# Patient Record
Sex: MALE | Race: WHITE | NOT HISPANIC OR LATINO | Employment: FULL TIME | URBAN - METROPOLITAN AREA
[De-identification: names, ages, dates, MRNs, and addresses within clinical notes are randomized per-mention and may not be internally consistent; named-entity substitution may affect disease eponyms.]

---

## 2024-03-09 ENCOUNTER — OFFICE VISIT (OUTPATIENT)
Dept: URGENT CARE | Facility: URGENT CARE | Age: 54
End: 2024-03-09
Payer: COMMERCIAL

## 2024-03-09 VITALS
DIASTOLIC BLOOD PRESSURE: 139 MMHG | SYSTOLIC BLOOD PRESSURE: 193 MMHG | OXYGEN SATURATION: 99 % | HEART RATE: 91 BPM | WEIGHT: 200 LBS | TEMPERATURE: 98.4 F

## 2024-03-09 DIAGNOSIS — K04.7 DENTAL ABSCESS: Primary | ICD-10-CM

## 2024-03-09 PROCEDURE — 99203 OFFICE O/P NEW LOW 30 MIN: CPT | Performed by: INTERNAL MEDICINE

## 2024-03-09 RX ORDER — AMOXICILLIN 875 MG
875 TABLET ORAL 2 TIMES DAILY
Qty: 14 TABLET | Refills: 0 | Status: SHIPPED | OUTPATIENT
Start: 2024-03-09 | End: 2024-03-16

## 2024-03-09 NOTE — PROGRESS NOTES
Assessment & Plan     Dental abscess  - amoxicillin (AMOXIL) 875 MG tablet; Take 1 tablet (875 mg) by mouth 2 times daily for 7 days    Subjective   Luisito is a 53 year old, presenting for the following health issues:  Urgent Care and Dental Pain (C/O dental pain for 4 days)    HPI   Complaining of some dental pain in the right lower jaw that has been present for several days.  Now having more pain and swelling in the right lower neck and jaw as well as odynophagia. Denies fevers or chills.     Review of Systems  Constitutional, HEENT, cardiovascular, pulmonary, gi and gu systems are negative, except as otherwise noted.      Objective    BP (!) 193/139   Pulse 91   Temp 98.4  F (36.9  C) (Tympanic)   Wt 90.7 kg (200 lb)   SpO2 99%   There is no height or weight on file to calculate BMI.  Physical Exam   GENERAL: alert and no distress  HENT: tender to palpation at the right lower molar with gingival swelling; no other oropharyngeal swelling or erythema; modest right facial swelling  NECK: FROM without pain; no tenderness, adenopathy or mass          Signed Electronically by: Manohar Boone MD

## 2024-04-21 ENCOUNTER — OFFICE VISIT (OUTPATIENT)
Dept: URGENT CARE | Facility: URGENT CARE | Age: 54
End: 2024-04-21
Payer: COMMERCIAL

## 2024-04-21 VITALS
SYSTOLIC BLOOD PRESSURE: 190 MMHG | HEART RATE: 100 BPM | WEIGHT: 200 LBS | DIASTOLIC BLOOD PRESSURE: 110 MMHG | HEIGHT: 72 IN | TEMPERATURE: 97.8 F | OXYGEN SATURATION: 97 % | BODY MASS INDEX: 27.09 KG/M2

## 2024-04-21 DIAGNOSIS — I10 HYPERTENSION, UNSPECIFIED TYPE: ICD-10-CM

## 2024-04-21 DIAGNOSIS — K04.7 DENTAL INFECTION: Primary | ICD-10-CM

## 2024-04-21 PROCEDURE — 99214 OFFICE O/P EST MOD 30 MIN: CPT | Performed by: NURSE PRACTITIONER

## 2024-04-21 RX ORDER — IBUPROFEN 200 MG
200 TABLET ORAL EVERY 4 HOURS PRN
COMMUNITY

## 2024-04-21 RX ORDER — CLINDAMYCIN HCL 300 MG
300 CAPSULE ORAL 3 TIMES DAILY
Qty: 30 CAPSULE | Refills: 0 | Status: SHIPPED | OUTPATIENT
Start: 2024-04-21 | End: 2024-05-01

## 2024-04-21 NOTE — PROGRESS NOTES
Chief Complaint   Patient presents with    Urgent Care    Dental Pain     Pt in clinic to have eval for dental pain.     SUBJECTIVE:  Luisito Calderon is a 53 year old male presenting with right lower dental pain and swelling over the past couple days.  Completed amoxicillin about a month ago.  He has a dentist appointment in the morning.  Felt a swollen area in his mouth almost pop and provided relief of the pain momentarily.  Declines purulent discharge foul taste fever sweats chills nausea vomiting trismus.  Of note patient does suffer from whitecoat hypertension.  He does have a blood pressure cuff at home to start monitoring.  Declines headache blurred vision chest pain dizziness edema.  Has never been on blood pressure medicine before.  He does believe it is also elevated due to the pain.  Taking Tylenol ibuprofen.    No past medical history on file.  Current Outpatient Medications   Medication Sig Dispense Refill    clindamycin (CLEOCIN) 300 MG capsule Take 1 capsule (300 mg) by mouth 3 times daily for 10 days 30 capsule 0    ibuprofen (ADVIL/MOTRIN) 200 MG tablet Take 200 mg by mouth every 4 hours as needed for pain       No current facility-administered medications for this visit.     Social History     Tobacco Use    Smoking status: Never     Passive exposure: Never    Smokeless tobacco: Never   Substance Use Topics    Alcohol use: Not on file     No Known Allergies    Review of Systems  Systems negative except for those listed above in HPI.    OBJECTIVE:   BP (!) 190/110   Pulse 100   Temp 97.8  F (36.6  C) (Temporal)   Ht 1.829 m (6')   Wt 90.7 kg (200 lb)   SpO2 97%   BMI 27.12 kg/m    Physical Exam  Vitals reviewed.   Constitutional:       General: He is not in acute distress.     Appearance: Normal appearance. He is not ill-appearing, toxic-appearing or diaphoretic.   HENT:      Head: Normocephalic and atraumatic.      Nose: Nose normal.      Mouth/Throat:      Mouth: Mucous membranes are moist.       Pharynx: Oropharynx is clear.      Comments: Nothing notable on exam, no erythema edema purulence lymphadenopathy.  Eyes:      Extraocular Movements: Extraocular movements intact.      Conjunctiva/sclera: Conjunctivae normal.      Pupils: Pupils are equal, round, and reactive to light.   Cardiovascular:      Rate and Rhythm: Normal rate.      Pulses: Normal pulses.   Pulmonary:      Effort: Pulmonary effort is normal.   Musculoskeletal:         General: Normal range of motion.      Cervical back: Normal range of motion and neck supple.   Skin:     General: Skin is warm and dry.      Findings: No rash.   Neurological:      General: No focal deficit present.      Mental Status: He is alert and oriented to person, place, and time.   Psychiatric:         Mood and Affect: Mood normal.         Behavior: Behavior normal.       ASSESSMENT:    ICD-10-CM    1. Dental infection  K04.7 clindamycin (CLEOCIN) 300 MG capsule      2. Hypertension, unspecified type  I10         PLAN:     It is important that you make an appointment with a dentist.  Take antibiotic as directed.  Brush teeth twice daily  Salt water gargles- mix about 8 oz of water with about 1 tsp of salt. Swish and spit.  Take Tylenol or an NSAID such as ibuprofen or naproxen as needed for pain.  May take up to 1000 mg of Tylenol every 6-8 hours- do not exceed 4000 mg in 24 hours.  May take up to 600 mg ibuprofen every 6-8 hours.  Alternate Tylenol and Ibuprofen every 3-4 hours.  Please follow up with primary care provider if not improving, worsening or new symptoms or for any adverse reactions to medications.   Some patients with mild infection may also receive oral therapy initially, pending dental or surgical evaluation. The preferred oral regimen is amoxicillin-clavulanate (875 mg orally twice daily). For penicillin-allergic patients, we usually give clindamycin 300 mg to 450 mg orally three times daily. Antibiotics should be continued until local inflammation  has resolved completely, typically for a total of 7 to 14 days (UpToDate, 2019).    Discussed untreated hypertension with patient  He has been aware of this and suspects whitecoat hypertension anxiety and pain elevating his blood pressure  Monitor home trends daily for 2 weeks  Primary care referral placed, needs reevaluation to discuss ACE ARB following BMP  140/90 sustained meets criteria to treat  180/110 sustained with symptoms is hypertensive emergency  Recommended patient go to the ER if he is to develop headache blurred vision chest pain dizziness shortness of breath swelling weakness    Follow up with primary care provider with any problems, questions or concerns or if symptoms worsen or fail to improve. Patient agreed to plan and verbalized understanding.    Shayy Yun, STEPHIE-BC  [facility] [place]

## 2024-05-07 ENCOUNTER — OFFICE VISIT (OUTPATIENT)
Dept: FAMILY MEDICINE | Facility: CLINIC | Age: 54
End: 2024-05-07
Attending: NURSE PRACTITIONER
Payer: COMMERCIAL

## 2024-05-07 VITALS
OXYGEN SATURATION: 98 % | WEIGHT: 203.5 LBS | SYSTOLIC BLOOD PRESSURE: 169 MMHG | HEART RATE: 85 BPM | TEMPERATURE: 98.8 F | BODY MASS INDEX: 27.56 KG/M2 | DIASTOLIC BLOOD PRESSURE: 119 MMHG | HEIGHT: 72 IN | RESPIRATION RATE: 11 BRPM

## 2024-05-07 DIAGNOSIS — Z23 NEED FOR VACCINATION: ICD-10-CM

## 2024-05-07 DIAGNOSIS — I10 ESSENTIAL HYPERTENSION: Primary | ICD-10-CM

## 2024-05-07 LAB
ALBUMIN UR-MCNC: NEGATIVE MG/DL
APPEARANCE UR: CLEAR
BACTERIA #/AREA URNS HPF: ABNORMAL /HPF
BILIRUB UR QL STRIP: NEGATIVE
COLOR UR AUTO: YELLOW
GLUCOSE UR STRIP-MCNC: NEGATIVE MG/DL
HGB UR QL STRIP: ABNORMAL
KETONES UR STRIP-MCNC: NEGATIVE MG/DL
LEUKOCYTE ESTERASE UR QL STRIP: NEGATIVE
NITRATE UR QL: NEGATIVE
PH UR STRIP: 6 [PH] (ref 5–7)
RBC #/AREA URNS AUTO: ABNORMAL /HPF
SP GR UR STRIP: 1.02 (ref 1–1.03)
SQUAMOUS #/AREA URNS AUTO: ABNORMAL /LPF
UROBILINOGEN UR STRIP-ACNC: 0.2 E.U./DL
WBC #/AREA URNS AUTO: ABNORMAL /HPF

## 2024-05-07 PROCEDURE — 36415 COLL VENOUS BLD VENIPUNCTURE: CPT | Performed by: FAMILY MEDICINE

## 2024-05-07 PROCEDURE — 90471 IMMUNIZATION ADMIN: CPT | Performed by: FAMILY MEDICINE

## 2024-05-07 PROCEDURE — 90750 HZV VACC RECOMBINANT IM: CPT | Performed by: FAMILY MEDICINE

## 2024-05-07 PROCEDURE — 99214 OFFICE O/P EST MOD 30 MIN: CPT | Mod: 25 | Performed by: FAMILY MEDICINE

## 2024-05-07 PROCEDURE — 90715 TDAP VACCINE 7 YRS/> IM: CPT | Performed by: FAMILY MEDICINE

## 2024-05-07 PROCEDURE — 80053 COMPREHEN METABOLIC PANEL: CPT | Performed by: FAMILY MEDICINE

## 2024-05-07 PROCEDURE — 81001 URINALYSIS AUTO W/SCOPE: CPT | Performed by: FAMILY MEDICINE

## 2024-05-07 PROCEDURE — 80061 LIPID PANEL: CPT | Performed by: FAMILY MEDICINE

## 2024-05-07 PROCEDURE — 90472 IMMUNIZATION ADMIN EACH ADD: CPT | Performed by: FAMILY MEDICINE

## 2024-05-07 RX ORDER — AMLODIPINE BESYLATE 10 MG/1
5-10 TABLET ORAL DAILY
Qty: 90 TABLET | Refills: 3 | Status: SHIPPED | OUTPATIENT
Start: 2024-05-07 | End: 2024-06-12

## 2024-05-07 ASSESSMENT — PAIN SCALES - GENERAL: PAINLEVEL: NO PAIN (0)

## 2024-05-07 NOTE — PROGRESS NOTES
"  Assessment & Plan     Essential hypertension  - REVIEW OF HEALTH MAINTENANCE PROTOCOL ORDERS  - Lipid panel reflex to direct LDL Non-fasting; Future  - amLODIPine (NORVASC) 10 MG tablet; Take 0.5-1 tablets (5-10 mg) by mouth daily  - Comprehensive metabolic panel (BMP + Alb, Alk Phos, ALT, AST, Total. Bili, TP); Future  - UA Macroscopic with reflex to Microscopic and Culture - Lab Collect; Future    Need for vaccination  - TDAP 10-64Y (ADACEL,BOOSTRIX)  - ZOSTER RECOMBINANT ADJUVANTED (SHINGRIX)      MED REC REQUIRED  Post Medication Reconciliation Status: discharge medications reconciled and changed, per note/orders  BMI  Estimated body mass index is 27.87 kg/m  as calculated from the following:    Height as of this encounter: 1.82 m (5' 11.65\").    Weight as of this encounter: 92.3 kg (203 lb 8 oz).   Weight management plan: Discussed healthy diet and exercise guidelines      Brittney Jorge is a 53 year old, presenting for the following health issues:  Hypertension      5/7/2024     2:54 PM   Additional Questions   Roomed by Minna JACOBS     HTN  Tonya   Takes it at home over the last couple weeks   Usually 150s /100s at home   169/119 here   No chest pain   No LOC    No trouble breathing   No urine problems  Once a night to pee   A little  harder to empty   No significant joint pain     Thinks he may have had gout a year ago     Once a year will have a second of  palpitation           Objective    BP (!) 169/119 (BP Location: Right arm, Patient Position: Sitting, Cuff Size: Adult Regular)   Pulse 85   Temp 98.8  F (37.1  C) (Temporal)   Resp 11   Ht 1.82 m (5' 11.65\")   Wt 92.3 kg (203 lb 8 oz)   SpO2 98%   BMI 27.87 kg/m    Body mass index is 27.87 kg/m .  Physical Exam  Constitutional:       General: He is not in acute distress.  Eyes:      General: No scleral icterus.  Cardiovascular:      Rate and Rhythm: Normal rate and regular rhythm.   Pulmonary:      Effort: No respiratory distress.      " Breath sounds: Normal breath sounds.   Neurological:      Mental Status: He is alert.   Psychiatric:         Mood and Affect: Mood normal.         Behavior: Behavior normal.               Signed Electronically by: Richi Singh DO

## 2024-05-08 LAB
ALBUMIN SERPL BCG-MCNC: 4.8 G/DL (ref 3.5–5.2)
ALP SERPL-CCNC: 38 U/L (ref 40–150)
ALT SERPL W P-5'-P-CCNC: 32 U/L (ref 0–70)
ANION GAP SERPL CALCULATED.3IONS-SCNC: 11 MMOL/L (ref 7–15)
AST SERPL W P-5'-P-CCNC: 26 U/L (ref 0–45)
BILIRUB SERPL-MCNC: 0.5 MG/DL
BUN SERPL-MCNC: 15.5 MG/DL (ref 6–20)
CALCIUM SERPL-MCNC: 10 MG/DL (ref 8.6–10)
CHLORIDE SERPL-SCNC: 99 MMOL/L (ref 98–107)
CHOLEST SERPL-MCNC: 193 MG/DL
CREAT SERPL-MCNC: 1.06 MG/DL (ref 0.67–1.17)
DEPRECATED HCO3 PLAS-SCNC: 27 MMOL/L (ref 22–29)
EGFRCR SERPLBLD CKD-EPI 2021: 83 ML/MIN/1.73M2
FASTING STATUS PATIENT QL REPORTED: NO
FASTING STATUS PATIENT QL REPORTED: NO
GLUCOSE SERPL-MCNC: 91 MG/DL (ref 70–99)
HDLC SERPL-MCNC: 50 MG/DL
LDLC SERPL CALC-MCNC: 121 MG/DL
NONHDLC SERPL-MCNC: 143 MG/DL
POTASSIUM SERPL-SCNC: 4.5 MMOL/L (ref 3.4–5.3)
PROT SERPL-MCNC: 7.9 G/DL (ref 6.4–8.3)
SODIUM SERPL-SCNC: 137 MMOL/L (ref 135–145)
TRIGL SERPL-MCNC: 112 MG/DL

## 2024-05-19 ENCOUNTER — HEALTH MAINTENANCE LETTER (OUTPATIENT)
Age: 54
End: 2024-05-19

## 2024-06-12 ENCOUNTER — MYC MEDICAL ADVICE (OUTPATIENT)
Dept: FAMILY MEDICINE | Facility: CLINIC | Age: 54
End: 2024-06-12
Payer: COMMERCIAL

## 2024-06-12 DIAGNOSIS — I10 ESSENTIAL HYPERTENSION: Primary | ICD-10-CM

## 2024-06-12 RX ORDER — LOSARTAN POTASSIUM 50 MG/1
25-50 TABLET ORAL DAILY
Qty: 90 TABLET | Refills: 3 | Status: SHIPPED | OUTPATIENT
Start: 2024-06-12

## 2024-12-16 ENCOUNTER — MYC REFILL (OUTPATIENT)
Dept: FAMILY MEDICINE | Facility: CLINIC | Age: 54
End: 2024-12-16
Payer: COMMERCIAL

## 2024-12-16 DIAGNOSIS — I10 ESSENTIAL HYPERTENSION: ICD-10-CM

## 2024-12-17 RX ORDER — LOSARTAN POTASSIUM 50 MG/1
25-50 TABLET ORAL DAILY
Qty: 90 TABLET | Refills: 3 | Status: SHIPPED | OUTPATIENT
Start: 2024-12-17

## 2025-02-12 ENCOUNTER — OFFICE VISIT (OUTPATIENT)
Dept: FAMILY MEDICINE | Facility: CLINIC | Age: 55
End: 2025-02-12
Payer: COMMERCIAL

## 2025-02-12 VITALS
WEIGHT: 213 LBS | RESPIRATION RATE: 18 BRPM | TEMPERATURE: 98 F | HEART RATE: 95 BPM | OXYGEN SATURATION: 100 % | BODY MASS INDEX: 28.85 KG/M2 | SYSTOLIC BLOOD PRESSURE: 156 MMHG | HEIGHT: 72 IN | DIASTOLIC BLOOD PRESSURE: 102 MMHG

## 2025-02-12 DIAGNOSIS — N50.811 PAIN IN RIGHT TESTICLE: Primary | ICD-10-CM

## 2025-02-12 DIAGNOSIS — I10 ESSENTIAL HYPERTENSION: ICD-10-CM

## 2025-02-12 PROCEDURE — G2211 COMPLEX E/M VISIT ADD ON: HCPCS

## 2025-02-12 PROCEDURE — 99214 OFFICE O/P EST MOD 30 MIN: CPT

## 2025-02-12 PROCEDURE — 36415 COLL VENOUS BLD VENIPUNCTURE: CPT

## 2025-02-12 PROCEDURE — 80048 BASIC METABOLIC PNL TOTAL CA: CPT

## 2025-02-12 RX ORDER — LOSARTAN POTASSIUM 50 MG/1
100 TABLET ORAL DAILY
Status: SHIPPED
Start: 2025-02-12

## 2025-02-12 ASSESSMENT — PAIN SCALES - GENERAL: PAINLEVEL_OUTOF10: MILD PAIN (1)

## 2025-02-12 NOTE — PROGRESS NOTES
"  Assessment & Plan     Essential hypertension  - losartan (COZAAR) 50 MG tablet; Take 2 tablets (100 mg) by mouth daily.  - Basic metabolic panel  (Ca, Cl, CO2, Creat, Gluc, K, Na, BUN); Future  - Basic metabolic panel  (Ca, Cl, CO2, Creat, Gluc, K, Na, BUN)  Hypertension is still uncontrolled.  Okay to increase to 100 mg losartan once daily.  Recommend follow-up in 1 to 2 months regarding blood pressure.  Could consider addition of combined tablet of water pill at that time.    Pain in right testicle  - US Testicular & Scrotum w Doppler Ltd; Future  Most likely etiology is varicocele given physical exam (epididymal welling and \"bag of worms\" consistency). Could also be an epididymal head cyst. Unlikely to be torison given low pain level.  Unlikely to be orchitis or epididymitis given absence of pain reported with palpation on exam.      Subjective   Luisito is a 54 year old, presenting for the following health issues:  Testicular Problem (Discomfort in testicle started at end of December)    Right sided testicle pain for about 2 months. No edema/erythema. Seems to be worse in fetal position.  No alleviating factors. Low level pain. Constant pain.   Patient thought there might be a lump.     Sexually active. One partner. Monogamous.  Partner has not had any symptoms. No white discharge.   Harder to empty the bladder (this has been gradual).    BP med restarted 2 months ago (50 mg Losartan). He is still elevated at home with numbers 150s/90s.       2/12/2025     1:09 PM   Additional Questions   Roomed by Feliz EVERETT     History of Present Illness       Reason for visit:  Minor discomfort in right testicle  Symptom onset:  More than a month  Symptom intensity:  Mild  Symptom progression:  Staying the same  Had these symptoms before:  No   He is taking medications regularly.           Objective    BP (!) 156/102   Pulse 95   Temp 98  F (36.7  C) (Temporal)   Resp 18   Ht 1.82 m (5' 11.65\")   Wt 96.6 kg (213 lb)   SpO2 " 100%   BMI 29.17 kg/m    Body mass index is 29.17 kg/m .  Physical Exam   GENERAL: alert and no distress   (male): normal male genitalia without lesions or urethral discharge, no hernia. Slightly swollen epididymal area noted (non-tender). Baseline scarring/hair loss from previous inguinal hernia surgery  PSYCH: mentation appears normal, affect normal/bright            Signed Electronically by: Mendez Britt NP

## 2025-02-13 LAB
ANION GAP SERPL CALCULATED.3IONS-SCNC: 12 MMOL/L (ref 7–15)
BUN SERPL-MCNC: 18.3 MG/DL (ref 6–20)
CALCIUM SERPL-MCNC: 10.3 MG/DL (ref 8.8–10.4)
CHLORIDE SERPL-SCNC: 103 MMOL/L (ref 98–107)
CREAT SERPL-MCNC: 1.16 MG/DL (ref 0.67–1.17)
EGFRCR SERPLBLD CKD-EPI 2021: 74 ML/MIN/1.73M2
GLUCOSE SERPL-MCNC: 105 MG/DL (ref 70–99)
HCO3 SERPL-SCNC: 26 MMOL/L (ref 22–29)
POTASSIUM SERPL-SCNC: 4.5 MMOL/L (ref 3.4–5.3)
SODIUM SERPL-SCNC: 141 MMOL/L (ref 135–145)

## 2025-02-19 ENCOUNTER — OFFICE VISIT (OUTPATIENT)
Dept: URGENT CARE | Facility: URGENT CARE | Age: 55
End: 2025-02-19
Payer: COMMERCIAL

## 2025-02-19 VITALS
RESPIRATION RATE: 16 BRPM | OXYGEN SATURATION: 98 % | SYSTOLIC BLOOD PRESSURE: 138 MMHG | TEMPERATURE: 98.4 F | HEART RATE: 61 BPM | DIASTOLIC BLOOD PRESSURE: 90 MMHG

## 2025-02-19 DIAGNOSIS — Z71.1: Primary | ICD-10-CM

## 2025-02-19 DIAGNOSIS — Z00.00 NORMAL EXAM: ICD-10-CM

## 2025-02-19 PROCEDURE — 99213 OFFICE O/P EST LOW 20 MIN: CPT | Performed by: NURSE PRACTITIONER

## 2025-02-19 NOTE — PATIENT INSTRUCTIONS
Monitor for now.  If you have any difficulty using your extremities or with pain developing you may return for recheck.

## 2025-02-19 NOTE — PROGRESS NOTES
Chief Complaint   Patient presents with    Urgent Care     Rolled over in bed last night, concern of dislocated collar bone, sticking out but not really in pain.          ICD-10-CM    1. Concern about musculoskeletal disease without diagnosis  Z71.1       2. Normal exam  Z00.00       Monitor for now.  If you develop pain or have difficulty using your extremities you may return for recheck.    Red flag warning signs and when to go to the emergency room discussed.  Reviewed potential adverse reactions to medications.    Subjective     Luisito Calderon is an 54 year old male who presents to clinic today for possible dislocated clavicle. He rolled over in bed last night and felt a pop       ROS: 10 point ROS neg other than the symptoms noted above in the HPI.       Objective    /90   Pulse 61   Temp 98.4  F (36.9  C) (Temporal)   Resp 16   SpO2 98%   Nurses notes and VS have been reviewed.    Physical Exam       GENERAL APPEARANCE: healthy appearing, alert     NECK: no adenopathy or asymmetry, thyroid normal to palpation     MS: extremities normal- no gross deformities noted; normal muscle tone.  Clavicles appear normal bilaterally, slightly more pronounced proximal clavicle on the left but nothing out of the normal range, no pain with firm palpation across the clavicle or shoulder, full range of motion of neck and all extremities without pain     SKIN: no suspicious lesions or rashes     NEURO: Normal strength and tone, mentation intact and speech normal      MAURA Verduzco, CNP  [facility] Provider    The use of [employer] dictation services may have been used to construct the content in this note; any grammatical or spelling errors are non-intentional. Please contact the author of this note directly if you are in need of any clarification.

## 2025-03-23 ENCOUNTER — MYC REFILL (OUTPATIENT)
Dept: FAMILY MEDICINE | Facility: CLINIC | Age: 55
End: 2025-03-23
Payer: COMMERCIAL

## 2025-03-23 DIAGNOSIS — I10 ESSENTIAL HYPERTENSION: ICD-10-CM

## 2025-03-25 RX ORDER — LOSARTAN POTASSIUM 50 MG/1
100 TABLET ORAL DAILY
Qty: 180 TABLET | Refills: 3 | Status: SHIPPED | OUTPATIENT
Start: 2025-03-25

## 2025-06-08 ENCOUNTER — HEALTH MAINTENANCE LETTER (OUTPATIENT)
Age: 55
End: 2025-06-08